# Patient Record
Sex: MALE
[De-identification: names, ages, dates, MRNs, and addresses within clinical notes are randomized per-mention and may not be internally consistent; named-entity substitution may affect disease eponyms.]

---
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
## 2023-03-11 ENCOUNTER — TELEPHONE (OUTPATIENT)
Dept: INTERNAL MEDICINE CLINIC | Facility: CLINIC | Age: 18
End: 2023-03-11

## 2023-03-11 NOTE — TELEPHONE ENCOUNTER
Patient contacted, he has irritation on the penis, not painful, red spotwhich increased in size, tried Neosporin, Lotrisone cream and it helped with the itching. Advised patient to keep area clean and dry, protect from injury and constant irritation. Can use Lotrisone cream once a day for 5 to 7 days only.   To be seen if symptoms persist.

## 2023-03-11 NOTE — TELEPHONE ENCOUNTER
Dr Enzo Lion, patient calling back, correct number to call him is [phone]. Would not share what his concern was, only wants to speak to Dr Enzo Lion.

## 2023-03-11 NOTE — TELEPHONE ENCOUNTER
Spoke to [employer], she will tell patient to call us back and provide his personal cell phone number

## 2023-03-11 NOTE — TELEPHONE ENCOUNTER
Patient states he would like a call from [address] [facility]. Patient would not tell the nurse the reason only that he wanted to privately discuss symptoms he is having with his doctor.

## 2024-07-24 ENCOUNTER — NURSE TRIAGE (OUTPATIENT)
Dept: INTERNAL MEDICINE CLINIC | Facility: CLINIC | Age: 19
End: 2024-07-24

## 2024-07-24 NOTE — TELEPHONE ENCOUNTER
Action Requested: Summary for Provider     []  Critical Lab, Recommendations Needed  [] Need Additional Advice  []   FYI    []   Need Orders  [] Need Medications Sent to Pharmacy  []  Other     SUMMARY: Disposition per protocol  is see today in office.  Patient declines first available appointment or Immediate Care due to work schedule and to check on his insurance.  Appointment scheduled:    Future Appointments   Date Time Provider Department Center   7/29/2024  9:40 AM Tuyet Lombardi, APRN [id number]  [place]         Reason for call: Tick  Onset:  1 week ago       Tick bite  on  left leg 1 week ago  Tick removed intact shortly after attachment. Tick was discarded.Some lower extremity joint pain and fatigue started right after  the bite happened. Some  intermittent feelings of muscle fatigue in lower body. Today Bean looks like a small dark purple scab without rings,redness,  swelling or tenderness. Afebrile. Reviewed care advice including wash bite with soap and water, apply over the counter antibiotic ointment, call back for pain, redness, swelling, rash, fever or any new symptoms, Symptoms of lyme disease can start within 3-30 days of bite.Reconsider earlier appointment or immediate care today if possible.Patient verbalizes understanding.   Reason for Disposition   Patient wants to be seen    Protocols used: Tick Bite-A-OH

## 2024-11-19 ENCOUNTER — LAB ENCOUNTER (OUTPATIENT)
Dept: LAB | Age: 19
End: 2024-11-19
Attending: NURSE PRACTITIONER
Payer: MEDICAID

## 2024-11-19 ENCOUNTER — OFFICE VISIT (OUTPATIENT)
Dept: INTERNAL MEDICINE CLINIC | Facility: CLINIC | Age: 19
End: 2024-11-19
Payer: MEDICAID

## 2024-11-19 VITALS
BODY MASS INDEX: 29.53 KG/M2 | WEIGHT: 218 LBS | HEART RATE: 101 BPM | SYSTOLIC BLOOD PRESSURE: 128 MMHG | DIASTOLIC BLOOD PRESSURE: 72 MMHG | HEIGHT: 72 IN

## 2024-11-19 DIAGNOSIS — Z13.29 THYROID DISORDER SCREEN: ICD-10-CM

## 2024-11-19 DIAGNOSIS — Z13.0 SCREENING FOR DEFICIENCY ANEMIA: ICD-10-CM

## 2024-11-19 DIAGNOSIS — Z00.00 WELLNESS EXAMINATION: ICD-10-CM

## 2024-11-19 DIAGNOSIS — Z00.00 WELLNESS EXAMINATION: Primary | ICD-10-CM

## 2024-11-19 DIAGNOSIS — Z13.220 LIPID SCREENING: ICD-10-CM

## 2024-11-19 DIAGNOSIS — B35.9 TINEA: ICD-10-CM

## 2024-11-19 LAB
ALBUMIN SERPL-MCNC: 5.3 G/DL (ref 3.2–4.8)
ALBUMIN/GLOB SERPL: 1.8 {RATIO} (ref 1–2)
ALP LIVER SERPL-CCNC: 81 U/L
ALT SERPL-CCNC: 30 U/L
ANION GAP SERPL CALC-SCNC: 7 MMOL/L (ref 0–18)
AST SERPL-CCNC: 19 U/L (ref ?–34)
BASOPHILS # BLD AUTO: 0.04 X10(3) UL (ref 0–0.2)
BASOPHILS NFR BLD AUTO: 0.9 %
BILIRUB SERPL-MCNC: 0.7 MG/DL (ref 0.3–1.2)
BUN BLD-MCNC: 11 MG/DL (ref 9–23)
BUN/CREAT SERPL: 11 (ref 10–20)
CALCIUM BLD-MCNC: 10.4 MG/DL (ref 8.7–10.4)
CHLORIDE SERPL-SCNC: 105 MMOL/L (ref 98–112)
CHOLEST SERPL-MCNC: 201 MG/DL (ref ?–200)
CO2 SERPL-SCNC: 28 MMOL/L (ref 21–32)
CREAT BLD-MCNC: 1 MG/DL
DEPRECATED RDW RBC AUTO: 38.1 FL (ref 35.1–46.3)
EGFRCR SERPLBLD CKD-EPI 2021: 110 ML/MIN/1.73M2 (ref 60–?)
EOSINOPHIL # BLD AUTO: 0.25 X10(3) UL (ref 0–0.7)
EOSINOPHIL NFR BLD AUTO: 5.3 %
ERYTHROCYTE [DISTWIDTH] IN BLOOD BY AUTOMATED COUNT: 11.8 % (ref 11–15)
FASTING PATIENT LIPID ANSWER: YES
FASTING STATUS PATIENT QL REPORTED: YES
GLOBULIN PLAS-MCNC: 2.9 G/DL (ref 2–3.5)
GLUCOSE BLD-MCNC: 96 MG/DL (ref 70–99)
HCT VFR BLD AUTO: 44.6 %
HDLC SERPL-MCNC: 53 MG/DL (ref 40–59)
HGB BLD-MCNC: 15.8 G/DL
IMM GRANULOCYTES # BLD AUTO: 0 X10(3) UL (ref 0–1)
IMM GRANULOCYTES NFR BLD: 0 %
LDLC SERPL CALC-MCNC: 135 MG/DL (ref ?–100)
LYMPHOCYTES # BLD AUTO: 1.17 X10(3) UL (ref 1–4)
LYMPHOCYTES NFR BLD AUTO: 25 %
MCH RBC QN AUTO: 31.4 PG (ref 26–34)
MCHC RBC AUTO-ENTMCNC: 35.4 G/DL (ref 31–37)
MCV RBC AUTO: 88.7 FL
MONOCYTES # BLD AUTO: 0.39 X10(3) UL (ref 0.1–1)
MONOCYTES NFR BLD AUTO: 8.3 %
NEUTROPHILS # BLD AUTO: 2.83 X10 (3) UL (ref 1.5–7.7)
NEUTROPHILS # BLD AUTO: 2.83 X10(3) UL (ref 1.5–7.7)
NEUTROPHILS NFR BLD AUTO: 60.5 %
NONHDLC SERPL-MCNC: 148 MG/DL (ref ?–130)
OSMOLALITY SERPL CALC.SUM OF ELEC: 289 MOSM/KG (ref 275–295)
PLATELET # BLD AUTO: 212 10(3)UL (ref 150–450)
POTASSIUM SERPL-SCNC: 4.2 MMOL/L (ref 3.5–5.1)
PROT SERPL-MCNC: 8.2 G/DL (ref 5.7–8.2)
RBC # BLD AUTO: 5.03 X10(6)UL
SODIUM SERPL-SCNC: 140 MMOL/L (ref 136–145)
TRIGL SERPL-MCNC: 69 MG/DL (ref 30–149)
TSI SER-ACNC: 1.15 UIU/ML (ref 0.55–4.78)
VLDLC SERPL CALC-MCNC: 13 MG/DL (ref 0–30)
WBC # BLD AUTO: 4.7 X10(3) UL (ref 4–11)

## 2024-11-19 PROCEDURE — 36415 COLL VENOUS BLD VENIPUNCTURE: CPT

## 2024-11-19 PROCEDURE — 85025 COMPLETE CBC W/AUTO DIFF WBC: CPT

## 2024-11-19 PROCEDURE — 90471 IMMUNIZATION ADMIN: CPT | Performed by: NURSE PRACTITIONER

## 2024-11-19 PROCEDURE — 99395 PREV VISIT EST AGE 18-39: CPT | Performed by: NURSE PRACTITIONER

## 2024-11-19 PROCEDURE — 90715 TDAP VACCINE 7 YRS/> IM: CPT | Performed by: NURSE PRACTITIONER

## 2024-11-19 PROCEDURE — 99213 OFFICE O/P EST LOW 20 MIN: CPT | Performed by: NURSE PRACTITIONER

## 2024-11-19 PROCEDURE — 84443 ASSAY THYROID STIM HORMONE: CPT

## 2024-11-19 PROCEDURE — 80053 COMPREHEN METABOLIC PANEL: CPT

## 2024-11-19 PROCEDURE — 80061 LIPID PANEL: CPT

## 2024-11-19 RX ORDER — KETOCONAZOLE 20 MG/ML
SHAMPOO, SUSPENSION TOPICAL
Qty: 120 ML | Refills: 1 | Status: SHIPPED | OUTPATIENT
Start: 2024-11-21

## 2024-11-19 RX ORDER — KETOCONAZOLE 20 MG/G
CREAM TOPICAL
Qty: 30 G | Refills: 0 | Status: SHIPPED | OUTPATIENT
Start: 2024-11-19

## 2024-11-19 NOTE — PROGRESS NOTES
Reji Morales is a 19 year old male.  HPI:   Pt is here for physical exam.  PMHx: denies any significant hx.  FamilyHx: none/unknown.   Not a smoker  Alcohol: socially.  Diet: mostly home cooked, limited fast foods.   Exercise: cardio and weights. 45-60 min/2x/week.  Reports recurrent spots on back, had a cream in the past that helped, was told it was tinea, would like refill.   Current Outpatient Medications   Medication Sig Dispense Refill    ketoconazole 2 % External Cream Apply twice  A day to affected area fro 2 weeks 30 g 0    [START ON 11/21/2024] ketoconazole 2 % External Shampoo Apply to affected area twice weekly. Apply to clean skin, leave on 5 minutes then rinse. 120 mL 1    COVID-19 Specimen Collection Does not apply Kit TEST AS DIRECTED TODAY (Patient not taking: Reported on 11/19/2024)        History reviewed. No pertinent past medical history.   Social History:  Social History     Socioeconomic History    Marital status: Single   Tobacco Use    Smoking status: Never    Smokeless tobacco: Never   Vaping Use    Vaping status: Never Used   Substance and Sexual Activity    Alcohol use: Yes     Alcohol/week: 1.0 - 2.0 standard drink of alcohol     Types: 1 - 2 Standard drinks or equivalent per week     Comment: per week    Drug use: Never        REVIEW OF SYSTEMS:   Review of Systems   Constitutional:  Negative for activity change, appetite change, fatigue and unexpected weight change.   HENT:  Negative for congestion and dental problem.    Eyes:  Negative for discharge and visual disturbance.   Respiratory:  Negative for cough, chest tightness, shortness of breath and wheezing.    Cardiovascular:  Negative for chest pain, palpitations and leg swelling.   Gastrointestinal:  Negative for abdominal pain, constipation, diarrhea, nausea and vomiting.   Endocrine: Negative.    Genitourinary:  Negative for decreased urine volume, difficulty urinating and frequency.   Musculoskeletal:  Negative for arthralgias  and back pain.   Skin:  Negative for color change, pallor and rash.   Neurological:  Negative for dizziness, seizures, numbness and headaches.   Psychiatric/Behavioral:  Negative for behavioral problems, dysphoric mood and suicidal ideas.           EXAM:   /72   Pulse 101   Ht 6' (1.829 m)   Wt 218 lb (98.9 kg)   BMI 29.57 kg/m²     Physical Exam  Vitals reviewed.   Constitutional:       Appearance: Normal appearance.   HENT:      Head: Normocephalic and atraumatic.      Right Ear: Tympanic membrane, ear canal and external ear normal. There is no impacted cerumen.      Left Ear: Tympanic membrane, ear canal and external ear normal. There is no impacted cerumen.      Nose: Nose normal.      Mouth/Throat:      Mouth: Mucous membranes are moist.      Pharynx: Oropharynx is clear.   Eyes:      General: No scleral icterus.        Right eye: No discharge.         Left eye: No discharge.      Extraocular Movements: Extraocular movements intact.      Conjunctiva/sclera: Conjunctivae normal.      Pupils: Pupils are equal, round, and reactive to light.   Neck:      Thyroid: No thyroid mass or thyromegaly.   Cardiovascular:      Rate and Rhythm: Normal rate and regular rhythm.      Pulses: Normal pulses.      Heart sounds: Normal heart sounds.   Pulmonary:      Effort: Pulmonary effort is normal. No respiratory distress.      Breath sounds: Normal breath sounds.   Abdominal:      General: Abdomen is flat. Bowel sounds are normal. There is no distension.      Palpations: Abdomen is soft. There is no mass.      Tenderness: There is no abdominal tenderness.   Musculoskeletal:         General: Normal range of motion.      Cervical back: Normal range of motion and neck supple.      Right lower leg: No edema.      Left lower leg: No edema.   Lymphadenopathy:      Cervical: No cervical adenopathy.      Upper Body:      Right upper body: No supraclavicular adenopathy.      Left upper body: No supraclavicular adenopathy.    Skin:     General: Skin is warm and dry.      Capillary Refill: Capillary refill takes less than 2 seconds.      Coloration: Skin is not jaundiced.      Comments: Faint erythematous patches scattered along R lateral back   Neurological:      General: No focal deficit present.      Mental Status: He is alert and oriented to person, place, and time.   Psychiatric:         Mood and Affect: Mood normal.         Judgment: Judgment normal.            ASSESSMENT AND PLAN:   1. Wellness examination  Education provided on healthy lifestyle.  Diet: reduce saturated fats, simple carbs and excess sugars. Hydrate. Leafy greens, legumes, nuts/seeds, healthy sources of Omega 3, lean proteins, complex carbs, berries.   Exercise 30 min daily cardio as tolerated.   Immunizations reviewed and recommendations provided  Preventative health screening recommendations reviewed.   Previous lab and imaging results reviewed.    - Comp Metabolic Panel (14); Future  - CBC With Differential With Platelet; Future  - TSH W Reflex To Free T4; Future  - Lipid Panel; Future    2. Lipid screening  - Lipid Panel; Future    3. Screening for deficiency anemia  - CBC With Differential With Platelet; Future    4. Thyroid disorder screen  - TSH W Reflex To Free T4; Future    5. Tinea  -Trial of ketoconazole shampoo and cream. Reviewed use.   - ketoconazole 2 % External Cream; Apply twice  A day to affected area fro 2 weeks  Dispense: 30 g; Refill: 0  - ketoconazole 2 % External Shampoo; Apply to affected area twice weekly. Apply to clean skin, leave on 5 minutes then rinse.  Dispense: 120 mL; Refill: 1       The patient indicates understanding of these issues and agrees to the plan.  The patient is asked to return in PRN/1 year.     The above note was creating using Dragon speech recognition technology. Please excuse any typos.

## 2025-01-21 ENCOUNTER — NURSE TRIAGE (OUTPATIENT)
Dept: INTERNAL MEDICINE CLINIC | Facility: CLINIC | Age: 20
End: 2025-01-21

## 2025-01-21 NOTE — TELEPHONE ENCOUNTER
Spoke to pt reports that his symptoms are improving started last Saturday, cough is less, but cough is very deep and at times it forceful.  He has no fever no shortness of breath, feels tightness in the chest occasional wheeze, advised him to use inhaler albuterol and I will treat him with antibiotic if no improvement needs to be seen in person prescription sent to the pharmacy, patient agreed to the plan

## 2025-01-21 NOTE — TELEPHONE ENCOUNTER
Action Requested: Summary for Provider     []  Critical Lab, Recommendations Needed  [x] Need Additional Advice  []   FYI    []   Need Orders  [] Need Medications Sent to Pharmacy  []  Other     SUMMARY: Disposition per protocol  is to be seen in office today. Patient declined  Symptoms started a few days ago, fever 101, runny nose and sore throat.   Those symptoms have resolved.  He now has a frequent  productive cough with mild shortness of breath and some tightness in chest with activity. Sputum is occasionally blood tinged.  Home Covid test was negative.  Dr Phillips: Patient declined office visit or telemedicine visit with Tuyet Lombardi today and he asks that Dr Phillips call him       Reason for call: Cough  Onset: a few days ago    Patient calling,verified name and date of birth. Fever, sore throat and runny nose have resolved and now has productive cough. He would like to speak to Dr Phillips.  Reviewed care advice including push fluids, over the counter cough syrup or cough drops, call back if fever returns or cough, shortness  of breath or chest tightness worsen. Patient verbalizes understanding.    Reason for Disposition   MILD difficulty breathing (e.g., minimal/no SOB at rest, SOB with walking, pulse <100) and still present when not coughing    Protocols used: Cough-A-OH